# Patient Record
Sex: MALE | Race: WHITE | NOT HISPANIC OR LATINO | URBAN - METROPOLITAN AREA
[De-identification: names, ages, dates, MRNs, and addresses within clinical notes are randomized per-mention and may not be internally consistent; named-entity substitution may affect disease eponyms.]

---
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
## 2023-08-21 ENCOUNTER — APPOINTMENT (OUTPATIENT)
Dept: ORTHOPEDIC SURGERY | Facility: CLINIC | Age: 23
End: 2023-08-21
Payer: COMMERCIAL

## 2023-08-21 ENCOUNTER — APPOINTMENT (OUTPATIENT)
Dept: MRI IMAGING | Facility: CLINIC | Age: 23
End: 2023-08-21
Payer: COMMERCIAL

## 2023-08-21 VITALS — WEIGHT: 150 LBS | HEIGHT: 68 IN | BODY MASS INDEX: 22.73 KG/M2

## 2023-08-21 DIAGNOSIS — Z78.9 OTHER SPECIFIED HEALTH STATUS: ICD-10-CM

## 2023-08-21 DIAGNOSIS — M79.18 MYALGIA, OTHER SITE: ICD-10-CM

## 2023-08-21 PROCEDURE — 73030 X-RAY EXAM OF SHOULDER: CPT | Mod: RT

## 2023-08-21 PROCEDURE — 99214 OFFICE O/P EST MOD 30 MIN: CPT

## 2023-08-21 PROCEDURE — 73010 X-RAY EXAM OF SHOULDER BLADE: CPT | Mod: RT

## 2023-08-21 PROCEDURE — 72040 X-RAY EXAM NECK SPINE 2-3 VW: CPT

## 2023-08-21 PROCEDURE — 73221 MRI JOINT UPR EXTREM W/O DYE: CPT | Mod: RT

## 2023-08-21 RX ORDER — NAPROXEN 500 MG/1
500 TABLET ORAL TWICE DAILY
Qty: 60 | Refills: 0 | Status: ACTIVE | COMMUNITY
Start: 2023-08-21 | End: 1900-01-01

## 2023-08-21 NOTE — IMAGING
[de-identified] : NECK: Inspection: no ecchymosis.  Palpation: trapezial tenderness.  Range of motion:  Full range of motion with mild stiffness . Pain at extremes of rotation to right.  Strength Testing: motor exam is non-focal throughout both lower extremities Normal Deltoid, Biceps, Triceps, Wrist Flexors, Finger Abductors, Grasp  Neurological testing: light touch is intact throughout both upper extremities James reflex: neg Spurling test: neg   RIGHT SHOULDER Inspection: No swelling.  Palpation: Tenderness is noted at the anterior shoulder.  Range of motion:  Full range of motion but with some pain. Strength: There is pain and discomfort with strength testing.  Neurological testing: motor and sensor intact distally. Ligament Stability and Special Tests:  Shoulder apprehension: pos Shoulder relocation: pos Obriens test: pos Biceps Active test: pos Asher Labral Shear: pos Impingement testing: neg Allison testing: pain Whipple: neg Cross Body Adduction: neg

## 2023-08-21 NOTE — ASSESSMENT
[FreeTextEntry1] : Right X-Ray Examination of the SHOULDER 2 views:  no fractures, subluxations or dislocations.  X-Ray Examination of the SCAPULA 1 or 2 views shows: no significant abnormalities X-Ray Examination of the CERVICAL SPINE 3 views (or less) shows: straightening consistent with spasm    Due to the patients instability event along with exam consistent with labral tear we will get an mri to eval integrity of labrum  - The patient was advised of the diagnosis.  The natural history of the pathology was explained to the patient in layman's terms.  Several different treatment options were discussed and explained including the risks and benefits of both surgical and non-surgical treatments. - The patient was advised to apply ice (wrapped in a towel or protective covering) to the area daily (20 minutes at a time, 2-4X/day). - Naprosyn rx - Patient was given a prescription for an anti-inflammatory medication.  They will take it for the next week and then on an as needed basis, as long as there are no medical contra-indications.  Patient is counseled on possible GI, renal, and cardiovascular side effects. - The patient was advised to modify their activities. - f/u after mri

## 2023-08-21 NOTE — HISTORY OF PRESENT ILLNESS
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  history of 2 prior instabilyt events in college tx nonop The pain is located anterior, lateral  and deep The pain is associated with clicking, popping, sense of instability, intemittnet N/T sense instabilyt event this weekned Worse with activity and better at rest. Has tried ice, Advil

## 2023-08-22 PROBLEM — M75.111 INCOMPLETE TEAR OF RIGHT ROTATOR CUFF, UNSPECIFIED WHETHER TRAUMATIC: Status: ACTIVE | Noted: 2023-08-22

## 2023-08-22 PROBLEM — S43.001A SUBLUXATION OF SHOULDER JOINT, RIGHT, INITIAL ENCOUNTER: Status: ACTIVE | Noted: 2023-08-21

## 2023-08-22 PROBLEM — M25.311 INSTABILITY OF RIGHT SHOULDER JOINT: Status: ACTIVE | Noted: 2023-08-21

## 2023-08-24 ENCOUNTER — APPOINTMENT (OUTPATIENT)
Dept: ORTHOPEDIC SURGERY | Facility: CLINIC | Age: 23
End: 2023-08-24
Payer: COMMERCIAL

## 2023-08-24 DIAGNOSIS — S43.001A UNSPECIFIED SUBLUXATION OF RIGHT SHOULDER JOINT, INITIAL ENCOUNTER: ICD-10-CM

## 2023-08-24 DIAGNOSIS — M25.311 OTHER INSTABILITY, RIGHT SHOULDER: ICD-10-CM

## 2023-08-24 DIAGNOSIS — M75.111 INCOMPLETE ROTATOR CUFF TEAR OR RUPTURE OF RIGHT SHOULDER, NOT SPECIFIED AS TRAUMATIC: ICD-10-CM

## 2023-08-24 PROCEDURE — 99214 OFFICE O/P EST MOD 30 MIN: CPT

## 2023-08-24 NOTE — IMAGING
[de-identified] : NECK: Inspection: no ecchymosis.  Palpation: trapezial tenderness.  Range of motion:  Full range of motion with mild stiffness . Pain at extremes of rotation to right.  Strength Testing: motor exam is non-focal throughout both lower extremities Normal Deltoid, Biceps, Triceps, Wrist Flexors, Finger Abductors, Grasp  Neurological testing: light touch is intact throughout both upper extremities James reflex: neg Spurling test: neg   RIGHT SHOULDER Inspection: No swelling.  Palpation: Tenderness is noted at the anterior shoulder.  Range of motion:  Full range of motion but with some pain. Strength: There is pain and discomfort with strength testing.  Neurological testing: motor and sensor intact distally. Ligament Stability and Special Tests:  Shoulder apprehension: pos Shoulder relocation: pos Obriens test: pos Biceps Active test: pos Asher Labral Shear: pos Impingement testing: neg Allison testing: pain Whipple: neg Cross Body Adduction: neg

## 2023-08-24 NOTE — HISTORY OF PRESENT ILLNESS
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  history of 2 prior instabilyt events in college tx nonop The pain is located anterior, lateral  and deep The pain is associated with clicking, popping, sense of instability, intemittnet N/T sense instabilyt event this weekned Worse with activity and better at rest. Has tried ice, Advil   8/22/23 - had mri showing capsule strain , icing, mod activity

## 2023-08-24 NOTE — ASSESSMENT
[FreeTextEntry1] : mri right shoulder 8/21/23 - capsular sprain, no obvous labral tear, RTC tendinopathy with fraying    - The patient was advised of the diagnosis.  The natural history of the pathology was explained to the patient in layman's terms.  Several different treatment options were discussed and explained including the risks and benefits of both surgical and non-surgical treatments.  All questions and concerns were answered. - We will continue conservative treatment with PT, icing, and anti-inflammatory medications. - The patient was provided with a prescription for Physical Therapy. - Naprosyn rx - Patient was given a prescription for an anti-inflammatory medication.  They will take it for the next week and then on an as needed basis, as long as there are no medical contra-indications.  Patient is counseled on possible GI, renal, and cardiovascular side effects. - The patient was advised to let pain guide the gradual advancement of activities. - if cont instability MRA to eval for labral tearing